# Patient Record
(demographics unavailable — no encounter records)

---

## 2024-10-25 NOTE — DISCUSSION/SUMMARY
[FreeTextEntry1] : A - Elevated AFP - 2.86 MOM  p_ AFP repeated today,      will contact pt with results,      in room sono done for evaluation of fetus and FHR.  ( courtesy)

## 2025-05-16 NOTE — HISTORY OF PRESENT ILLNESS
[Postpartum Follow Up] : postpartum follow up [Delivery Date: ___] : on [unfilled] [Female] : Delivery History: baby girl [Girl] : baby is a girl [Infant's Name ___] : [unfilled] [___ Lbs] : [unfilled] lbs [___ Oz] : [unfilled] oz [Living at Home] : is currently living at home [Bottle Feeding] : bottle feeding [Breastfeeding] : currently nursing [Resumed Menses] : has resumed her menses [Resumed [place]] : has resumed intercourse [Intended Contraception] : Intended Contraception: [] : delivered by vaginal delivery [Wt. ___] : weighing [unfilled] [BF with Difficulty] : nursing without difficulty [Back to Normal] : is back to normal in size [Mild] : mild vaginal bleeding [Normal] : the vagina was normal [Cervix Sample Taken] : cervical sample not taken for a Pap smear [Examination Of The Breasts] : breasts are normal [Doing Well] : is doing well [None] : None [FreeTextEntry8] : 24 y.o.  now P 1001 s/p   3/29/25 live female,  " Fabby ", 5lbs 12oz. now 7 weeks PP. pt is breast and formula feeding, pt feels well, menses began  [de-identified] : 7 weeks PP s/p   Sri STONEIGlenn  [de-identified] : MMR today, pt wants IUD, - [employer]  will request authorization